# Patient Record
Sex: FEMALE | Race: WHITE | HISPANIC OR LATINO | Employment: STUDENT | URBAN - METROPOLITAN AREA
[De-identification: names, ages, dates, MRNs, and addresses within clinical notes are randomized per-mention and may not be internally consistent; named-entity substitution may affect disease eponyms.]

---
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
## 2025-03-12 NOTE — PROGRESS NOTES
Answers for HPI/ROS submitted by the patient on 2/13/2017   activity change: No  appetite change : No  fever: No  congestion: No  sore throat: Yes  eye discharge: No  eye redness: No  cough: Yes  wheezing: No  palpitations: No  chest pain: No  constipation: No  diarrhea: No  vomiting: No  difficulty urinating: No  hematuria: No  enuresis: No  rash: No  wound: No  behavior problem: No  sleep disturbance: No  headaches: No  syncope: No     No

## (undated) DEVICE — SPONGE GAUZE 16PLY 4X4

## (undated) DEVICE — CAUTERY BOVIE PENCIL

## (undated) DEVICE — ELECTRODE NEEDLE 1IN

## (undated) DEVICE — PACK BASIC